# Patient Record
(demographics unavailable — no encounter records)

---

## 2017-03-30 NOTE — UC
Leander ANGELA Matthew, scribed for Ad Sher MD on 17 at 1139 .





Throat Pain/Nasal Adrian HPI





- HPI Summary


HPI Summary: 


A 30 y/o female [place] [facility] [place] throat since a couple of weeks ago

, which has worsened in the last 2 days. Associated symptoms include pain with 

swallowing, swollen lymph nodes, and headache. The patient denies difficulty 

swallowing, drooling, and fever. The patient daughter was recently Dx with 

STREP throat. Her symptoms worsen at night and the patient has increased pain 

when looking up. PMHx of c-diff. SHx includes ulnar transposition, gallbladder 

removal, . FHx of graves disease. 











- History of Current Complaint


Chief Complaint: UCGeneralIllness


Stated Complaint: SORE THROAT


Time Seen by Provider: 17 11:27


Hx Obtained From: Patient


Hx Last Menstrual Period: 


Pregnant?: No


Onset/Duration: Gradual Onset, Lasting Weeks, Still Present


Severity: Mild


Pain Intensity: 4


Pain Scale Used: 0-10 Numeric


Cough: None


Associated Signs & Symptoms: Positive: Other - pain with swallowing.  Negative: 

Dysphagia, FB Sensation, Drooling, Fever





- Allergies/Home Medications


Allergies/Adverse Reactions: 


 Allergies











Allergy/AdvReac Type Severity Reaction Status Date / Time


 


No Known Allergies Allergy   Verified 17 11:22











Home Medications: 


 Home Medications





Colesevelam HCl [Welchol] 625 mg PO BID 17 [History Confirmed 17]


Levothyroxine TAB* [Synthroid TAB*] 50 mcg PO DAILY 17 [History Confirmed 

17]


Norethindrone (Contraceptive) [Jolivette] 1 PO DAILY 17 [History]











PMH/Surg Hx/FS Hx/Imm Hx


Endocrine History Of: Reports: Thyroid Disease - [facility]





- Surgical History


Surgical History: Yes


Surgery Procedure, Year, and Place: CHOLECYSTECTOMY .  LEFT ULNAR NERVE 

TRANSPOSITION.   





- Family History


Family History: FHx of graves disease





- Social History


Alcohol Use: Rare


Substance Use Type: None


Smoking Status (MU): Never Smoked Tobacco





Review of Systems


Constitutional: Negative


Skin: Negative


Eyes: Negative


ENT: Sore Throat, Other - Swollen lymph nodes; Pain with swallowing


Respiratory: Negative


Cardiovascular: Negative


Gastrointestinal: Negative


Genitourinary: Negative


Motor: Negative


Neurovascular: Negative


Musculoskeletal: Negative


Neurological: Headache


Psychological: Negative


All Other Systems Reviewed And Are Negative: Yes





Physical Exam


Triage Information Reviewed: Yes


Vital Signs: 


 Initial Vital Signs











Temp  98.3 F   17 11:17


 


Pulse  84   17 11:17


 


Resp  16   17 11:17


 


BP  153/75   17 11:17


 


Pulse Ox  100   17 11:17











Vital Signs Reviewed: Yes





- Additional Comments





VITAL SIGNS: Reviewed.


GENERAL:  Patient is a well developed and nourished female  who is lying 

comfortable in the stretcher.  Patient is not in any acute respiratory distress.


HEAD AND FACE: Normocephalic


EYES: PERRLA, EOMI x 2.


EARS: Hearing grossly intact.


MOUTH: Oropharynx within normal limits. No trismus, no difficulty swallowing, 

and no drooling; Minimal Pharyngeal erythema 


NECK: Supple, trachea is midline, no JVD, no carotid bruit. Right sided swollen 

lymph node on the neck 


CHEST: Symmetric, no tenderness at palpation


LUNGS: Clear to auscultation bilaterally. No wheezing or crackles.


CVS: Regular rate and rhythm, S1 and S2 present, no murmurs or gallops 

appreciated.


ABDOMEN: Soft, non-tender. Bowel sounds are normal. No abdominal abnormal 

pulsations.


EXTREMITIES: Full ROM in all major joints, no edema, no cyanosis or clubbing.


NEURO: Alert and oriented x 3. No acute neurological deficits. Speech is normal 

and follows commands.


SKIN: Dry and warm





Throat Pain/Nasal Course/Dx





- Course


Assessment/Plan: A 30 y/o female presents to [facility] [place] a sore throat since a 

couple of weeks ago, which has worsened in the last 2 days. Associated symptoms 

include pain with swallowing, swollen lymph nodes, and headache. The patient 

denies difficulty swallowing, drooling, and fever. The patient daughter was 

recently Dx with STREP throat. Her symptoms worsen at night and the patient has 

increased pain when looking up. PMHx of c-diff. SHx includes ulnar transposition

, gallbladder removal, . FHx of graves disease. Rapid STREP is 

negative and therefore I believe the patient symptoms are secondly to viral 

phalangitis. Since the patient does not have trismus, difficulty swallowing, 

and no fever the patient will be discharged home with follow-up from their PCP. 

The patient is hemodynamically stable and A&Ox3.





- Differential Dx/Diagnosis


Provider Diagnoses: Viral pharyngitis





Discharge





- Discharge Plan


Condition: Stable


Disposition: HOME


Patient Education Materials:  Pharyngitis (ED)


Referrals: 


Boubacar Porter NP [Primary Care Provider] - 4 Days


Additional Instructions: 


Please follow-up with your primary care physician. 





The documentation as recorded by the Leander santiago Matthew accurately 

reflects the service I personally performed and the decisions made by me, Ad Sher MD.